# Patient Record
Sex: MALE | Race: WHITE
[De-identification: names, ages, dates, MRNs, and addresses within clinical notes are randomized per-mention and may not be internally consistent; named-entity substitution may affect disease eponyms.]

---

## 2020-07-28 ENCOUNTER — HOSPITAL ENCOUNTER (EMERGENCY)
Dept: HOSPITAL 62 - ER | Age: 30
Discharge: HOME | End: 2020-07-28
Payer: COMMERCIAL

## 2020-07-28 VITALS — SYSTOLIC BLOOD PRESSURE: 116 MMHG | DIASTOLIC BLOOD PRESSURE: 73 MMHG

## 2020-07-28 DIAGNOSIS — R51: Primary | ICD-10-CM

## 2020-07-28 DIAGNOSIS — R19.7: ICD-10-CM

## 2020-07-28 DIAGNOSIS — Z86.69: ICD-10-CM

## 2020-07-28 LAB
ADD MANUAL DIFF: NO
ALBUMIN SERPL-MCNC: 4.7 G/DL (ref 3.5–5)
ALP SERPL-CCNC: 59 U/L (ref 38–126)
ANION GAP SERPL CALC-SCNC: 6 MMOL/L (ref 5–19)
APPEARANCE UR: CLEAR
APTT PPP: COLORLESS S
AST SERPL-CCNC: 35 U/L (ref 17–59)
BARBITURATES UR QL SCN: NEGATIVE
BASOPHILS # BLD AUTO: 0 10^3/UL (ref 0–0.2)
BASOPHILS NFR BLD AUTO: 0.5 % (ref 0–2)
BILIRUB DIRECT SERPL-MCNC: 0.1 MG/DL (ref 0–0.4)
BILIRUB SERPL-MCNC: 1 MG/DL (ref 0.2–1.3)
BILIRUB UR QL STRIP: NEGATIVE
BUN SERPL-MCNC: 17 MG/DL (ref 7–20)
CALCIUM: 9.4 MG/DL (ref 8.4–10.2)
CHLORIDE SERPL-SCNC: 108 MMOL/L (ref 98–107)
CO2 SERPL-SCNC: 24 MMOL/L (ref 22–30)
EOSINOPHIL # BLD AUTO: 0.1 10^3/UL (ref 0–0.6)
EOSINOPHIL NFR BLD AUTO: 0.9 % (ref 0–6)
ERYTHROCYTE [DISTWIDTH] IN BLOOD BY AUTOMATED COUNT: 12.8 % (ref 11.5–14)
GLUCOSE SERPL-MCNC: 85 MG/DL (ref 75–110)
GLUCOSE UR STRIP-MCNC: NEGATIVE MG/DL
HCT VFR BLD CALC: 49.8 % (ref 37.9–51)
HGB BLD-MCNC: 17.4 G/DL (ref 13.5–17)
KETONES UR STRIP-MCNC: NEGATIVE MG/DL
LYMPHOCYTES # BLD AUTO: 2.4 10^3/UL (ref 0.5–4.7)
LYMPHOCYTES NFR BLD AUTO: 31.3 % (ref 13–45)
MCH RBC QN AUTO: 29.4 PG (ref 27–33.4)
MCHC RBC AUTO-ENTMCNC: 34.9 G/DL (ref 32–36)
MCV RBC AUTO: 84 FL (ref 80–97)
METHADONE UR QL SCN: NEGATIVE
MONOCYTES # BLD AUTO: 0.4 10^3/UL (ref 0.1–1.4)
MONOCYTES NFR BLD AUTO: 5.2 % (ref 3–13)
NEUTROPHILS # BLD AUTO: 4.8 10^3/UL (ref 1.7–8.2)
NEUTS SEG NFR BLD AUTO: 62.1 % (ref 42–78)
NITRITE UR QL STRIP: NEGATIVE
PCP UR QL SCN: NEGATIVE
PH UR STRIP: 6 [PH] (ref 5–9)
PLATELET # BLD: 230 10^3/UL (ref 150–450)
POTASSIUM SERPL-SCNC: 4.5 MMOL/L (ref 3.6–5)
PROT SERPL-MCNC: 7.9 G/DL (ref 6.3–8.2)
PROT UR STRIP-MCNC: NEGATIVE MG/DL
RBC # BLD AUTO: 5.91 10^6/UL (ref 4.35–5.55)
SP GR UR STRIP: 1
TOTAL CELLS COUNTED % (AUTO): 100 %
URINE AMPHETAMINES SCREEN: NEGATIVE
URINE BENZODIAZEPINES SCREEN: NEGATIVE
URINE COCAINE SCREEN: NEGATIVE
URINE MARIJUANA (THC) SCREEN: NEGATIVE
UROBILINOGEN UR-MCNC: NEGATIVE MG/DL (ref ?–2)
WBC # BLD AUTO: 7.7 10^3/UL (ref 4–10.5)

## 2020-07-28 PROCEDURE — 96361 HYDRATE IV INFUSION ADD-ON: CPT

## 2020-07-28 PROCEDURE — 85025 COMPLETE CBC W/AUTO DIFF WBC: CPT

## 2020-07-28 PROCEDURE — 99284 EMERGENCY DEPT VISIT MOD MDM: CPT

## 2020-07-28 PROCEDURE — 96374 THER/PROPH/DIAG INJ IV PUSH: CPT

## 2020-07-28 PROCEDURE — 96375 TX/PRO/DX INJ NEW DRUG ADDON: CPT

## 2020-07-28 PROCEDURE — 81001 URINALYSIS AUTO W/SCOPE: CPT

## 2020-07-28 PROCEDURE — 36415 COLL VENOUS BLD VENIPUNCTURE: CPT

## 2020-07-28 PROCEDURE — 80307 DRUG TEST PRSMV CHEM ANLYZR: CPT

## 2020-07-28 PROCEDURE — 80053 COMPREHEN METABOLIC PANEL: CPT

## 2020-07-28 NOTE — ER DOCUMENT REPORT
ED Medical Screen (RME)





- General


Chief Complaint: Headache


Stated Complaint: HEADACHE


Time Seen by Provider: 07/28/20 11:48


Notes: 





Patient is a 29-year-old male who presents emergency department with a chief 

complaint of a headache and diarrhea.  Patient states that he has flashing 

lights in his vision.  States that he has never had this before.  His symptoms 

started yesterday.  Patient also had diarrhea over the weekend.  He states that 

he was okay and then yesterday he had a set of a chicken salad from [employer] 

and then had diarrhea again today.  Patient states that he has history of 

migraines, but states that this headache is different than his normal migraines.

 Patient denies any contact with anybody who has tested positive for COVID-19.





Exam: Alert and oriented.





I have greeted and performed a rapid initial assessment of this patient.  A 

comprehensive ED assessment and evaluation of the patient, analysis of test 

results and completion of medical decision making process will be conducted by 

an additional ED providers.





- Related Data


Allergies/Adverse Reactions: 


                                        





No Known Allergies Allergy (Verified 07/28/20 11:46)


   











Past Medical History





- Social History


Chew tobacco use (# tins/day): No


Frequency of alcohol use: Social


Drug Abuse: None


Neurological Medical History: Reports: Hx Migraine





Physical Exam





- Vital signs


Vitals: 





                                        











Temp Pulse Resp BP Pulse Ox


 


 99.1 F   71   18   136/88 H  99 


 


 07/28/20 11:24  07/28/20 11:24  07/28/20 11:24  07/28/20 11:24  07/28/20 11:24














Course





- Vital Signs


Vital signs: 





                                        











Temp Pulse Resp BP Pulse Ox


 


 99.1 F   71   18   136/88 H  99 


 


 07/28/20 11:47  07/28/20 11:24  07/28/20 11:24  07/28/20 11:24  07/28/20 11:24

## 2020-07-28 NOTE — ER DOCUMENT REPORT
ED Headache





- General


Chief Complaint: Headache


Stated Complaint: HEADACHE


Time Seen by Provider: 07/28/20 11:48


Primary Care Provider: 


[facility] [Provider Group] - Follow up as needed


[facility] [Provider Group] - Follow up as needed


ARACELI RODRÍGUEZ MD [NO LOCAL MD] - Follow up as needed


Mode of Arrival: Ambulatory


Information source: Patient


Notes: 





29-year-old male presented to ED for complaint of headache and diarrhea.  He 

states is mostly just the headache that he is concerned about he states he has 

migraines but usually he can just take some over-the-counter medicine this time 

he is having some pressure and he does have some aura with his migraine.  He 

states he also had some diarrhea after eating some chicken salad from 

[employer].  He did have some diarrhea today but he is not really concerned 

about the diarrhea is much is the headache.  He states that he does not get 

diarrhea often but this is after he ate the food.  He is alert oriented 

respirations regular nonlabored speaking in full sentences.  He does not have 

any neck tenderness he has full range of motion to the neck and spine.  He has 

normal vital signs no fever no tachycardia pulse oxes are good.  Will treat with

headache medications obtain urine for drug screen and ensure that his chemistry 

is okay.  His CBC is within normal limits.  He is a little dry on the CBC.  He 

will get a second liter of fluid.





- HPI


Patient complains to provider of: "Migraine", Other - Diarrhea after eating 

chicken fillet.


Onset: Yesterday


Timing: Still present


Severity: Mild


Pain Level: 2


Associated symptoms: Other - Headache nausea the nausea is relieved intermittent

diarrhea from [employer]


Exacerbated by: Light, Movement


Similar symptoms previously: Yes





- Related Data


Allergies/Adverse Reactions: 


                                        





No Known Allergies Allergy (Verified 07/28/20 11:46)


   











Past Medical History





- General


Information source: Patient





- Social History


Smoking Status: Never Smoker


Chew tobacco use (# tins/day): No


Frequency of alcohol use: Social


Drug Abuse: None


Family History: Reviewed & Not Pertinent


Patient has homicidal ideation: No





- Past Medical History


Cardiac Medical History: Reports: None


Pulmonary Medical History: Reports: None


EENT Medical History: Reports: None


Neurological Medical History: Reports: Hx Migraine


Endocrine Medical History: Reports: None


Renal/ Medical History: Reports: None


Malignancy Medical History: Reports None


GI Medical History: Reports: None


Musculoskeletal Medical History: Reports None


Skin Medical History: Reports None


Psychiatric Medical History: Reports: None


Traumatic Medical History: Reports: None


Infectious Medical History: Reports: None


Surgical Hx: Negative


Past Surgical History: Reports: None





Review of Systems





- Review of Systems


Constitutional: No symptoms reported


EENT: Nose congestion, Nose discharge, Sinus pressure


Cardiovascular: No symptoms reported


Respiratory: No symptoms reported


Gastrointestinal: Diarrhea - After [employer], Nausea


Genitourinary: No symptoms reported


Male Genitourinary: No symptoms reported


Musculoskeletal: No symptoms reported


Skin: No symptoms reported


Hematologic/Lymphatic: No symptoms reported


Neurological/Psychological: Headaches


-: Yes All other systems reviewed and negative





Physical Exam





- Vital signs


Vitals: 


                                        











Temp Pulse Resp BP Pulse Ox


 


 99.1 F   71   18   136/88 H  99 


 


 07/28/20 11:24  07/28/20 11:24  07/28/20 11:24  07/28/20 11:24  07/28/20 11:24











Interpretation: Normal





- General


General appearance: Appears well, Alert





- HEENT


Head: Normocephalic, Atraumatic


Eyes: Normal


Pupils: PERRL


Ears: Normal


External canal: Normal


Tympanic membrane: Normal


Sinus: Normal


Nasal: Purulent discharge, Swelling


Mucous membranes: Normal


Pharynx: Post nasal drainage


Neck: Normal





- Respiratory


Respiratory status: No respiratory distress


Chest status: Nontender


Breath sounds: Normal


Chest palpation: Normal





- Cardiovascular


Rhythm: Regular


Heart sounds: Normal auscultation


Murmur: No





- Abdominal


Inspection: Normal


Distension: No distension


Bowel sounds: Normal


Tenderness: Nontender


Organomegaly: No organomegaly





- Back


Back: Normal, Nontender





- Extremities


General upper extremity: Normal inspection, Nontender, Normal color, Normal ROM,

Normal temperature


General lower extremity: Normal inspection, Nontender, Normal color, Normal ROM,

Normal temperature, Normal weight bearing.  No: Luis's sign





- Neurological


Neuro grossly intact: Yes


Cognition: Normal


Orientation: AAOx4


Dionicio Coma Scale Eye Opening: Spontaneous


Dionicio Coma Scale Verbal: Oriented


Dionicio Coma Scale Motor: Obeys Commands


Dionicio Coma Scale Total: 15


Speech: Normal


Cranial nerves: Normal


Cerebellar coordination: Normal


Motor strength normal: LUE, RUE, LLE, RLE


Additional motor exam normals: Equal 


Babinski reflex: Normal (flexor plantar)


Sensory: Normal





- Psychological


Associated symptoms: Normal affect, Normal mood





- Skin


Skin Temperature: Warm


Skin Moisture: Dry


Skin Color: Normal





Course





- Re-evaluation


Re-evalutation: 





07/29/20 01:33


After performing a Medical Screening Examination, I estimate there is LOW risk 

for ACUTE GLAUCOMA, TEMPORAL ARTERITIS, MENINGITIS, INCRANIAL HEMORRHAGE, or 

ISCHEMIC STROKE thus I consider the discharge disposition reasonable.  I have 

reevaluated this patient multiple times and no significant life threatening 

changes are noted. The patient and I have discussed the diagnosis and risks, and

we agree with discharging home with close follow-up with the understanding that 

symptoms and presentations can change. We also discussed returning to the 

Emergency Department immediately if new or worsening symptoms occur. We have 

discussed the symptoms which are most concerning (e.g., changing or worsening 

symptoms, new numbness or weakness, vomiting, fever) that necessitate immediate 

return.





- Vital Signs


Vital signs: 


                                        











Temp Pulse Resp BP Pulse Ox


 


 98.3 F   70   12   116/73   100 


 


 07/28/20 15:16  07/28/20 15:16  07/28/20 15:16  07/28/20 15:16  07/28/20 15:16














- Laboratory


Result Diagrams: 


                                 07/28/20 12:20





                                 07/28/20 14:05


Laboratory results interpreted by me: 


                                        











  07/28/20 07/28/20





  12:20 14:05


 


RBC  5.91 H 


 


Hgb  17.4 H 


 


Chloride   108 H














Discharge





- Discharge


Clinical Impression: 


Headache


Qualifiers:


 Headache type: unspecified Headache chronicity pattern: acute headache 

Intractability: not intractable Qualified Code(s): R51 - Headache





Condition: Stable


Disposition: HOME, SELF-CARE


Additional Instructions: 


HEADACHE:





     The physician does not feel that the headache you are experiencing has a 

serious underlying cause.  Most headaches are due to emotional stress, with 

resultant muscle tension (tension headache). Occasionally, headaches are 

secondary to changes in the blood vessels of the scalp (vascular headache and 

migraine headache).  Sometimes, a headache is the first symptom of another 

developing illness, such as a viral infection.  You have no evidence of stroke, 

bleeding, meningitis, or other serious cause of your headache.


     The treatment of headaches varies with the severity and cause of the pain. 

Not all headaches need pain shots.  In fact, there is evidence that using 

narcotics for headaches may make them worse in the long run.  The physician will

determine the therapy that's in your best interest.


     If you develop a fever, if the headache is different from any you've 

previously experienced, or if the headache progressively worsens, then call your

physician at once or go to the emergency room.








REGLAN (METOCLOPRAMIDE):


     Reglan has been prescribed.  This medicine affects the stomach and 

intestines.  It can be used to treat nausea and vomiting, to prevent reflux of 

stomach acid up into the esophagus, or to increase the contractions of the 

stomach and intestines.  It is often prescribed for esophagitis, and for 

paralysis of the stomach in diabetics.


     Reglan can cause either mild restlessness or drowsiness.  You should 

contact the doctor at once if you become extremely restless, anxious, or cannot 

sleep, or if you develop uncontrollable motions of the lips, tongue, or jaw.


     Do not take alcohol with this medicine.  Do not drive or operate machinery 

until you have been taking this medicine long enough to know how it affects you.


     Call the doctor if you develop abdominal pains, lightheadedness, black 

stool, or blood in the stool or vomitus.








USE OF DIPHENHYDRAMINE:


     Diphenhydramine (Benadryl) is an antihistamine and has been recommended to 

help treat your headache and to prevent side effects of other medications used 

to treat headaches.  The medication can be repeated four times daily.


     Age         Elixir (12.5 mg/tsp)         25 mg pill


     adult                                     1-2 tabs


     Antihistamines may cause drowsiness, especially with the first dose.  Do 

not operate machinery or drive while under the effects of the medication.  Do 

not combine the medication with alcohol, or with any other medication without 

talking to your doctor.








[place] MEDICATION:


     You have been given a medication to suppress nausea and vomiting. This type

of medication can be given as a shot, pill, or suppository. It will usually last

for many hours.  Pills and shots usually last six to eight hours, suppositories 

last about 12 hours.  For the typical illness, only one or two doses of the 

medication may be necessary.


     Mild lightheadedness may occur.  This type of medicine can cause 

drowsiness.  Do not drive or operate dangerous machinery while under its 

influence.  Do not mix with alcohol.


     See your doctor at once if you have muscle spasms or tightness, or 

uncontrollable motions (particularly of the neck, mouth, or jaw). Persistent 

vomiting or severe lightheadedness should also be evaluated by the physician.





COMPAZINE FOR HEADACHE:


     He was sent to home with a prescription for Compazine.  This treatment is 

dramatically successful in relieving the headache in about 50 percent of cases. 

When it works, it provides a rapid method of eliminating the headache without 

resorting to narcotics (and the problems associated with them).


     Most patients still feel fully alert after the Compazine, but others may be

slightly drowsy.  It's best not to drive or work with machinery for six to eight

hours.  Do not take alcohol or other medication unless you discuss it with the 

doctor.


     If you develop tightness and spasms in your muscles, especially the neck 

and tongue, you should return.  This is a side effect which can be treated.








TORADOL INJECTION:


     You have been given an injection of ketorolac tromethamine (Toradol).  This

is an excellent, safe drug for pain control.  It also has potent antiinflamm

atory action.  You should have significant pain relief within about one hour.


     Toradol is not addicting and is non-sedating.  It does not interfere with 

driving or work.


     Call or return if you develop itching, hives, shortness of breath, or rash.





Ibuprofen





     Ibuprofen is an excellent, safe drug for pain control.  In addition, it has

potent antiinflammatory effects which are beneficial, especially in the 

treatment of injuries, arthritis, or tendonitis. It's best to take ibuprofen 

with food.  Persons with ulcer disease or allergy to aspirin should notify their

physician of this before taking ibuprofen.


     Take the medication exactly as prescribed.  Don't take additional doses 

unless instructed to do so by your doctor.  If you develop wheezing, shortness 

of breath, hives, faintness, stomach pain, vomiting, or dark black stools, 

return for re-evaluation at once.





FOLLOW-UP CARE:


If you have been referred to a physician for follow-up care, call the [facility] office for an appointment as you were instructed or within the next two 

days.  If you experience worsening or a significant change in your symptoms, 

notify the physician immediately or return to the Emergency Department at any 

time for re-evaluation.


Prescriptions: 


Prochlorperazine Maleate [Compazine 10 mg Tablet] 10 mg PO Q6HP PRN #10 tablet


 PRN Reason: 


Ibuprofen [Motrin 800 mg Tablet] 800 mg PO Q8H PRN #30 tab


 PRN Reason: For Headache


Forms:  Elevated Blood Pressure, Return to Work


Referrals: 


[facility] [Provider Group] - Follow up as needed


[facility] [Provider Group] - Follow up as needed


ARACELI RODRÍGUEZ MD [NO LOCAL MD] - Follow up as needed